# Patient Record
Sex: FEMALE | Race: BLACK OR AFRICAN AMERICAN | NOT HISPANIC OR LATINO | Employment: OTHER | URBAN - METROPOLITAN AREA
[De-identification: names, ages, dates, MRNs, and addresses within clinical notes are randomized per-mention and may not be internally consistent; named-entity substitution may affect disease eponyms.]

---

## 2024-03-22 ENCOUNTER — TELEPHONE (OUTPATIENT)
Dept: OPHTHALMOLOGY | Facility: CLINIC | Age: 74
End: 2024-03-22
Payer: MEDICARE

## 2024-03-22 NOTE — TELEPHONE ENCOUNTER
----- Message from Diamone Speed sent at 3/22/2024  2:37 PM CDT -----  Regarding: self  Type: Patient Call Back       Who called: self        What is the request in detail: pt needs a call back to get an appt schedule        Can the clinic reply by [facility]? Yes        Would the patient rather a call back or a response via [facility]?call back       Best call back number [phone]

## 2024-05-09 ENCOUNTER — DOCUMENTATION ONLY (OUTPATIENT)
Dept: OPHTHALMOLOGY | Facility: CLINIC | Age: 74
End: 2024-05-09

## 2024-05-09 ENCOUNTER — OFFICE VISIT (OUTPATIENT)
Dept: OPHTHALMOLOGY | Facility: CLINIC | Age: 74
End: 2024-05-09
Payer: MEDICARE

## 2024-05-09 DIAGNOSIS — E11.9 DIABETES MELLITUS TYPE 2 WITHOUT RETINOPATHY: ICD-10-CM

## 2024-05-09 DIAGNOSIS — H25.811 COMBINED FORMS OF AGE-RELATED CATARACT OF RIGHT EYE: Primary | ICD-10-CM

## 2024-05-09 DIAGNOSIS — H25.812 COMBINED FORMS OF AGE-RELATED CATARACT OF LEFT EYE: ICD-10-CM

## 2024-05-09 PROCEDURE — 99212 OFFICE O/P EST SF 10 MIN: CPT | Mod: PBBFAC | Performed by: OPHTHALMOLOGY

## 2024-05-09 PROCEDURE — 99999 PR PBB SHADOW E&M-EST. PATIENT-LVL II: CPT | Mod: PBBFAC,,, | Performed by: OPHTHALMOLOGY

## 2024-05-09 PROCEDURE — 99204 OFFICE O/P NEW MOD 45 MIN: CPT | Mod: S$PBB,,, | Performed by: OPHTHALMOLOGY

## 2024-05-09 RX ORDER — ASPIRIN 81 MG/1
1 TABLET ORAL DAILY
COMMUNITY

## 2024-05-09 RX ORDER — AMLODIPINE BESYLATE 5 MG/1
1 TABLET ORAL DAILY
COMMUNITY

## 2024-05-09 RX ORDER — PREDNISOLONE ACETATE 10 MG/ML
1 SUSPENSION/ DROPS OPHTHALMIC 4 TIMES DAILY
Qty: 5 ML | Refills: 0 | Status: SHIPPED | OUTPATIENT
Start: 2024-05-09 | End: 2024-06-17 | Stop reason: SDUPTHER

## 2024-05-09 RX ORDER — METOPROLOL TARTRATE 25 MG/1
1 TABLET, FILM COATED ORAL 2 TIMES DAILY
COMMUNITY

## 2024-05-09 RX ORDER — MELOXICAM 7.5 MG/1
1 TABLET ORAL DAILY
COMMUNITY

## 2024-05-09 RX ORDER — VIT C/E/ZN/COPPR/LUTEIN/ZEAXAN 250MG-90MG
CAPSULE ORAL
COMMUNITY

## 2024-05-09 RX ORDER — NEPAFENAC 3 MG/ML
1 SUSPENSION/ DROPS OPHTHALMIC DAILY
Qty: 3 ML | Refills: 0 | Status: SHIPPED | OUTPATIENT
Start: 2024-05-09 | End: 2024-06-17 | Stop reason: SDUPTHER

## 2024-05-09 NOTE — PROGRESS NOTES
HPI     Cataract     Additional comments: New patient  Last exam 03/2024      Patient states VA has decreased at all ranges in OU over the last 3   months, images appear dull and cloudy along with glare sensitivity.   Patient states she was seen in March by another physician and was told she   had cataracts, also has a current RX for glasses that is not with her   secondary to her feeling that the glasses do not improve her VA.            Comments    1. Cataracts OU  2. Diabetic since 2015 (approx)             Last edited by Lien Mejia, Patient Care Assistant on 5/9/2024 10:19   AM.            Assessment /Plan     For exam results, see Encounter Report.    Visually Significant Cataract OS  Patient reports decreased vision consistent with the clinical amount of lenticular opacity. Cataract is visually significant and affects activities of daily living including reading and glare. Risks, benefits, and alternatives to cataract surgery were discussed. Discussion of risks included increased dryness, possibility of infection, need for more surgery, as well as permanent vision loss.The patient expressed a desire to proceed with surgery with the potential for some reasonable degree of visual improvement. Recommended regular use of artificial tears and good lid hygiene to optimize surgical outcome.     Discussed intraocular lens options. Monofocal - Distance. Patient understands that glasses will be generally needed at all times for near vision and often for finer distance correction especially for higher degrees of astigmatism.     Final visual acuity may be limited by None    None    Other considerations: None    Dilation: good  Alpha Blockers: none    Following medications prescribed:  Prednisolone   Ilevro     Combined forms of age-related cataract of right eye  Will address after OS    Diabeties Type ll without Retinopathy        Diabetes controlled with no diabetic retinopathy on dilated exam. Reviewed diabetic eye  precautions including excellent blood sugar control, and importance of regular follow up.

## 2024-05-09 NOTE — PROGRESS NOTES
Short Stay Record    Diagnosis: Nuclear Sclerotic Cataract left    CC: Blurry Vision     HPI:  Kamilah Manley is a 73 y.o. female who presents for evaluation prior to ophthalmic surgery. No current complaints.     Past Medical History:   Diagnosis Date    Cataract     Diabetes mellitus      No past surgical history on file.  Social History     Tobacco Use    Smoking status: Never    Smokeless tobacco: Never   Substance Use Topics    Alcohol use: Not on file     Family History   Problem Relation Name Age of Onset    Cancer Brother      Hypertension Maternal Grandmother      Diabetes Maternal Grandmother      Blindness Maternal Grandmother       Review of patient's allergies indicates:   Allergen Reactions    Naproxen Other (See Comments)    Rofecoxib Other (See Comments)    Penicillins          Current Outpatient Medications:     amLODIPine (NORVASC) 5 MG tablet, Take 1 tablet by mouth once daily., Disp: , Rfl:     aspirin (ECOTRIN) 81 MG EC tablet, Take 1 tablet by mouth once daily., Disp: , Rfl:     cholecalciferol, vitamin D3, (VITAMIN D3) 25 mcg (1,000 unit) capsule, Take 1 unit twice a day by oral route., Disp: , Rfl:     meloxicam (MOBIC) 7.5 MG tablet, Take 1 tablet by mouth once daily., Disp: , Rfl:     metoprolol tartrate (LOPRESSOR) 25 MG tablet, Take 1 tablet by mouth 2 (two) times daily., Disp: , Rfl:     nepafenac (ILEVRO) 0.3 % DrpS, Apply 1 drop to eye once daily., Disp: 3 mL, Rfl: 0    prednisoLONE acetate (PRED FORTE) 1 % DrpS, Place 1 drop into the left eye 4 (four) times daily., Disp: 5 mL, Rfl: 0    Review of Systems:  10 Pt ROS negative except as stated in HPI    Physical Exam:  General Appearance:    A&Ox3, no distress, appears stated age   Head:    Normocephalic, without obvious abnormality, atraumatic   Eyes:    PERRL, EOM's intact   Back:     Symmetric, no curvature   Lungs:     respirations unlabored   Chest Wall:    No tenderness or deformity    Heart:  Abdomen:  Extremities:  Skin:    S1  and S2 present    Soft, non-tender    Extremities normal, atraumatic    Skin color, texture, turgor normal     Patient is stable for ophthalmic surgery under local and MAC.       _

## 2024-05-24 ENCOUNTER — OFFICE VISIT (OUTPATIENT)
Dept: OPHTHALMOLOGY | Facility: CLINIC | Age: 74
End: 2024-05-24
Payer: MEDICARE

## 2024-05-24 DIAGNOSIS — H25.812 COMBINED FORMS OF AGE-RELATED CATARACT OF LEFT EYE: Primary | ICD-10-CM

## 2024-05-24 PROCEDURE — 99212 OFFICE O/P EST SF 10 MIN: CPT | Mod: PBBFAC,25 | Performed by: OPHTHALMOLOGY

## 2024-05-24 PROCEDURE — 99499 UNLISTED E&M SERVICE: CPT | Mod: S$PBB,,, | Performed by: OPHTHALMOLOGY

## 2024-05-24 PROCEDURE — 92136 OPHTHALMIC BIOMETRY: CPT | Mod: PBBFAC | Performed by: OPHTHALMOLOGY

## 2024-05-24 PROCEDURE — 99999 PR PBB SHADOW E&M-EST. PATIENT-LVL II: CPT | Mod: PBBFAC,,, | Performed by: OPHTHALMOLOGY

## 2024-05-24 NOTE — PROGRESS NOTES
HPI    1. Cataracts OU  2. Diabetic since 2015 (approx)   Pt here for measurements    Last edited by Jordyn Sandoval MD on 5/24/2024  1:47 PM.            Assessment /Plan     For exam results, see Encounter Report.    Combined forms of age-related cataract of left eye  -     IOL Master - OU - Both Eyes  Cataract surgery measurements done today. See 5/9/24 note for pre op information.

## 2024-06-10 ENCOUNTER — OUTSIDE PLACE OF SERVICE (OUTPATIENT)
Dept: OPHTHALMOLOGY | Facility: CLINIC | Age: 74
End: 2024-06-10
Payer: MEDICARE

## 2024-06-11 ENCOUNTER — OFFICE VISIT (OUTPATIENT)
Dept: OPHTHALMOLOGY | Facility: CLINIC | Age: 74
End: 2024-06-11
Payer: MEDICARE

## 2024-06-11 DIAGNOSIS — Z98.890 POST-OPERATIVE STATE: Primary | ICD-10-CM

## 2024-06-11 DIAGNOSIS — Z98.42 STATUS POST CATARACT EXTRACTION AND INSERTION OF INTRAOCULAR LENS OF LEFT EYE: ICD-10-CM

## 2024-06-11 DIAGNOSIS — Z96.1 STATUS POST CATARACT EXTRACTION AND INSERTION OF INTRAOCULAR LENS OF LEFT EYE: ICD-10-CM

## 2024-06-11 PROCEDURE — 99999 PR PBB SHADOW E&M-EST. PATIENT-LVL II: CPT | Mod: PBBFAC,,, | Performed by: OPHTHALMOLOGY

## 2024-06-11 PROCEDURE — 99212 OFFICE O/P EST SF 10 MIN: CPT | Mod: PBBFAC | Performed by: OPHTHALMOLOGY

## 2024-06-11 PROCEDURE — 99024 POSTOP FOLLOW-UP VISIT: CPT | Mod: POP,,, | Performed by: OPHTHALMOLOGY

## 2024-06-11 NOTE — PROGRESS NOTES
HPI     Post-op Evaluation     Additional comments: S/p OS 06/10/2024 Pt states vision doing well   understands she is still healing. Currently using Ilevro once a day and   pred acetate qid           Last edited by Terry Coello on 6/11/2024  9:11 AM.            Assessment /Plan     For exam results, see Encounter Report.    Post-operative state  Status post cataract extraction and insertion of intraocular lens of left eye  POD#1 S/P phaco/IOL OS Doing well. Patient denies significant pain. Mild edema    Continue gtts to operative eye:  PF QID  Ilevro    Reinstructed on importance of absolute compliance with Post-OP instructions including medications, shield at bedtime, protective glasses during the day, and limitation of activities. Follow up appointments in approximately one and four weeks or call immediately for increased pain, redness or vision loss.       RTC 1 week. MOCT if PH worse than 20/25

## 2024-06-11 NOTE — PROGRESS NOTES
HPI     Post-op Evaluation     Additional comments: S/p OS 06/10/2024 Pt states vision doing well   understands she is still healing. Currently using Ilevro once a day and   pred acetate qid           Last edited by Terry Coello on 6/11/2024  9:11 AM.            Assessment /Plan     For exam results, see Encounter Report.    Post-operative state  POD#1 S/P phaco/IOL OS Doing well. Patient denies significant pain. Mild edema    Continue gtts to operative eye:  PF QID  Ilevro    Reinstructed on importance of absolute compliance with Post-OP instructions including medications, shield at bedtime, protective glasses during the day, and limitation of activities. Follow up appointments in approximately one and four weeks or call immediately for increased pain, redness or vision loss.     RTC 1 week. MOCT if PH worse than 20/25

## 2024-06-11 NOTE — PROGRESS NOTES
HPI     Post-op Evaluation     Additional comments: S/p OS 06/10/2024 Pt states vision doing well   understands she is still healing. Currently using Ilevro once a day and   pred acetate qid           Last edited by Terry Coello on 6/11/2024  9:11 AM.            Assessment /Plan     For exam results, see Encounter Report.    Post-operative state      ***

## 2024-06-17 ENCOUNTER — DOCUMENTATION ONLY (OUTPATIENT)
Dept: OPHTHALMOLOGY | Facility: CLINIC | Age: 74
End: 2024-06-17

## 2024-06-17 ENCOUNTER — OFFICE VISIT (OUTPATIENT)
Dept: OPHTHALMOLOGY | Facility: CLINIC | Age: 74
End: 2024-06-17
Payer: MEDICARE

## 2024-06-17 DIAGNOSIS — Z98.42 STATUS POST CATARACT EXTRACTION AND INSERTION OF INTRAOCULAR LENS OF LEFT EYE: ICD-10-CM

## 2024-06-17 DIAGNOSIS — Z96.1 STATUS POST CATARACT EXTRACTION AND INSERTION OF INTRAOCULAR LENS OF LEFT EYE: ICD-10-CM

## 2024-06-17 DIAGNOSIS — H25.811 COMBINED FORMS OF AGE-RELATED CATARACT OF RIGHT EYE: ICD-10-CM

## 2024-06-17 DIAGNOSIS — Z98.890 POST-OPERATIVE STATE: Primary | ICD-10-CM

## 2024-06-17 PROCEDURE — 99212 OFFICE O/P EST SF 10 MIN: CPT | Mod: PBBFAC | Performed by: OPHTHALMOLOGY

## 2024-06-17 PROCEDURE — 99024 POSTOP FOLLOW-UP VISIT: CPT | Mod: POP,,, | Performed by: OPHTHALMOLOGY

## 2024-06-17 PROCEDURE — 99999 PR PBB SHADOW E&M-EST. PATIENT-LVL II: CPT | Mod: PBBFAC,,, | Performed by: OPHTHALMOLOGY

## 2024-06-17 RX ORDER — NEPAFENAC 3 MG/ML
1 SUSPENSION/ DROPS OPHTHALMIC DAILY
Qty: 3 ML | Refills: 0 | Status: SHIPPED | OUTPATIENT
Start: 2024-06-17

## 2024-06-17 RX ORDER — PREDNISOLONE ACETATE 10 MG/ML
1 SUSPENSION/ DROPS OPHTHALMIC 4 TIMES DAILY
Qty: 5 ML | Refills: 0 | Status: SHIPPED | OUTPATIENT
Start: 2024-06-17

## 2024-06-17 NOTE — PROGRESS NOTES
HPI    Pt here for 1 week post op phaco/IOL OS  Current eye drops Ilevro and Pred.   Vision in OS doing well  Noticed swelling up sx eye on Sunday on the cheek area pain scale 0.  Pt denies pain and discomfort, denies flashes of light and floaters.       Last edited by Clemente Kee on 6/17/2024  9:58 AM.            Assessment /Plan     For exam results, see Encounter Report.    Post-operative state  Status post cataract extraction and insertion of intraocular lens of left eye  POW#1 S/P phaco/IOL OS : Doing well with no evidence of infection.     Continue gtts to operative eye:   Trace Cell. PF Taper 4-3-2-1 then stop    Pt given and instructed in one week postop instructions. Can resume normal activitites and d/c eye shield. OTC reading glasses can be used until evaluated for final MR.       Combined forms of age-related cataract of right eye  Patient reports decreased vision in the fellow eye consistent with the clinical amount of lenticular opacity. Cataract is visually significant and affects activities of daily living including reading and glare. Risks, benefits, and alternatives to cataract surgery were discussed. Discussion of risks included increased dryness, possibility of infection, need for more surgery, as well as permanent vision loss.The patient expressed a desire to proceed with surgery with the potential for some reasonable degree of visual improvement. Recommended regular use of artificial tears and good lid hygiene to optimize surgical outcome.     Discussed intraocular lens options. Monofocal - Distance. Patient understands that glasses will be generally needed at all times for near vision and often for finer distance correction especially for higher degrees of astigmatism.     Final visual acuity may be limited by astigmatism      Other considerations: None    Dilation: good  Alpha Blockers: none    Following medications prescribed:  Prednisolone   Ilevro

## 2024-06-17 NOTE — PROGRESS NOTES
Short Stay Record    Diagnosis: Nuclear Sclerotic Cataract right    CC: Blurry Vision     HPI:  Kamilah Manley is a 73 y.o. female who presents for evaluation prior to ophthalmic surgery. No current complaints.     Past Medical History:   Diagnosis Date    Cataract     Diabetes mellitus      No past surgical history on file.  Social History     Tobacco Use    Smoking status: Never    Smokeless tobacco: Never   Substance Use Topics    Alcohol use: Not on file     Family History   Problem Relation Name Age of Onset    Cancer Brother      Hypertension Maternal Grandmother      Diabetes Maternal Grandmother      Blindness Maternal Grandmother       Review of patient's allergies indicates:   Allergen Reactions    Naproxen Other (See Comments)    Rofecoxib Other (See Comments)    Penicillins          Current Outpatient Medications:     amLODIPine (NORVASC) 5 MG tablet, Take 1 tablet by mouth once daily., Disp: , Rfl:     aspirin (ECOTRIN) 81 MG EC tablet, Take 1 tablet by mouth once daily., Disp: , Rfl:     cholecalciferol, vitamin D3, (VITAMIN D3) 25 mcg (1,000 unit) capsule, Take 1 unit twice a day by oral route., Disp: , Rfl:     meloxicam (MOBIC) 7.5 MG tablet, Take 1 tablet by mouth once daily., Disp: , Rfl:     metoprolol tartrate (LOPRESSOR) 25 MG tablet, Take 1 tablet by mouth 2 (two) times daily., Disp: , Rfl:     nepafenac (ILEVRO) 0.3 % DrpS, Apply 1 drop to eye once daily., Disp: 3 mL, Rfl: 0    prednisoLONE acetate (PRED FORTE) 1 % DrpS, Place 1 drop into the left eye 4 (four) times daily., Disp: 5 mL, Rfl: 0    Review of Systems:  10 Pt ROS negative except as stated in HPI    Physical Exam:  General Appearance:    A&Ox3, no distress, appears stated age   Head:    Normocephalic, without obvious abnormality, atraumatic   Eyes:    PERRL, EOM's intact   Back:     Symmetric, no curvature   Lungs:     respirations unlabored   Chest Wall:    No tenderness or deformity    Heart:  Abdomen:  Extremities:  Skin:    S1  and S2 present    Soft, non-tender    Extremities normal, atraumatic    Skin color, texture, turgor normal     Patient is stable for ophthalmic surgery under local and MAC.       _

## 2024-07-01 ENCOUNTER — OUTSIDE PLACE OF SERVICE (OUTPATIENT)
Dept: OPHTHALMOLOGY | Facility: CLINIC | Age: 74
End: 2024-07-01
Payer: MEDICARE

## 2024-07-01 PROCEDURE — 66984 XCAPSL CTRC RMVL W/O ECP: CPT | Mod: 79,RT,, | Performed by: OPHTHALMOLOGY

## 2024-07-02 ENCOUNTER — OFFICE VISIT (OUTPATIENT)
Dept: OPHTHALMOLOGY | Facility: CLINIC | Age: 74
End: 2024-07-02
Payer: MEDICARE

## 2024-07-02 DIAGNOSIS — Z96.1 STATUS POST CATARACT EXTRACTION AND INSERTION OF INTRAOCULAR LENS OF RIGHT EYE: Primary | ICD-10-CM

## 2024-07-02 DIAGNOSIS — Z98.41 STATUS POST CATARACT EXTRACTION AND INSERTION OF INTRAOCULAR LENS OF RIGHT EYE: Primary | ICD-10-CM

## 2024-07-02 DIAGNOSIS — Z98.890 POST-OPERATIVE STATE: ICD-10-CM

## 2024-07-02 PROCEDURE — 99999 PR PBB SHADOW E&M-EST. PATIENT-LVL II: CPT | Mod: PBBFAC,,, | Performed by: OPHTHALMOLOGY

## 2024-07-02 PROCEDURE — 99212 OFFICE O/P EST SF 10 MIN: CPT | Mod: PBBFAC | Performed by: OPHTHALMOLOGY

## 2024-07-02 NOTE — PROGRESS NOTES
HPI     Post-op Evaluation     Additional comments: Pt is here for 1 day PCIOL OD. Denies pain or   irritation. Va stable. 100% with gtts           Comments    PCIOL OS 06/10/2024  PCIOL OD 07/01/2024  2. Diabetic since 2015 (approx)             Last edited by Sarita Villafuerte on 7/2/2024  9:43 AM.            Assessment /Plan     For exam results, see Encounter Report.  Post-operative state  Status post cataract extraction and insertion of intraocular lens of right eye  POD#1 S/P phaco/IOL OD Doing well. Patient denies significant pain. Mild edema    Continue gtts to operative eye:  PF QID  Ilevro    Reinstructed on importance of absolute compliance with Post-OP instructions including medications, shield at bedtime, protective glasses during the day, and limitation of activities. Follow up appointments in approximately one and four weeks or call immediately for increased pain, redness or vision loss.     RTC 1 week. MOCT if PH worse than 20/25

## 2024-07-09 ENCOUNTER — OFFICE VISIT (OUTPATIENT)
Dept: OPHTHALMOLOGY | Facility: CLINIC | Age: 74
End: 2024-07-09
Payer: MEDICARE

## 2024-07-09 DIAGNOSIS — Z98.890 POST-OPERATIVE STATE: Primary | ICD-10-CM

## 2024-07-09 DIAGNOSIS — Z98.42 STATUS POST CATARACT EXTRACTION AND INSERTION OF INTRAOCULAR LENS OF LEFT EYE: ICD-10-CM

## 2024-07-09 DIAGNOSIS — Z96.1 STATUS POST CATARACT EXTRACTION AND INSERTION OF INTRAOCULAR LENS OF LEFT EYE: ICD-10-CM

## 2024-07-09 DIAGNOSIS — Z98.41 STATUS POST CATARACT EXTRACTION AND INSERTION OF INTRAOCULAR LENS OF RIGHT EYE: ICD-10-CM

## 2024-07-09 DIAGNOSIS — Z96.1 STATUS POST CATARACT EXTRACTION AND INSERTION OF INTRAOCULAR LENS OF RIGHT EYE: ICD-10-CM

## 2024-07-09 PROCEDURE — 99999 PR PBB SHADOW E&M-EST. PATIENT-LVL II: CPT | Mod: PBBFAC,,, | Performed by: OPHTHALMOLOGY

## 2024-07-09 PROCEDURE — 99024 POSTOP FOLLOW-UP VISIT: CPT | Mod: POP,,, | Performed by: OPHTHALMOLOGY

## 2024-07-09 PROCEDURE — 99212 OFFICE O/P EST SF 10 MIN: CPT | Mod: PBBFAC | Performed by: OPHTHALMOLOGY

## 2024-07-09 NOTE — PROGRESS NOTES
HPI     Post-op Evaluation     Additional comments: Sp PCIOL OD 07/01/2024    Sp PCIOL OS 06/10/2024           Comments    No complaints per pt.    Pred Forte qid OD  Ilevro 1qd OD          Last edited by Gwen Velázquez MA on 7/9/2024 10:00 AM.            Assessment /Plan     For exam results, see Encounter Report.    Post-operative state  Status post cataract extraction and insertion of intraocular lens of right eye  POW#1 S/P phaco/IOL OD : Doing well with no evidence of infection.     Continue gtts to operative eye:   Trace Cell. PF Taper 4-3-2-1 then stop    Pt given and instructed in one week postop instructions. Can resume normal activitites and d/c eye shield. OTC reading glasses can be used until evaluated for final MR.        Status post cataract extraction and insertion of intraocular lens of left eye  Stable, monitor yearly.     RTC 3 weeks for glasses RX

## 2024-07-29 ENCOUNTER — OFFICE VISIT (OUTPATIENT)
Dept: OPHTHALMOLOGY | Facility: CLINIC | Age: 74
End: 2024-07-29
Payer: MEDICARE

## 2024-07-29 DIAGNOSIS — Z98.890 POST-OPERATIVE STATE: Primary | ICD-10-CM

## 2024-07-29 PROCEDURE — 99212 OFFICE O/P EST SF 10 MIN: CPT | Mod: PBBFAC | Performed by: OPHTHALMOLOGY

## 2024-07-29 PROCEDURE — 99999 PR PBB SHADOW E&M-EST. PATIENT-LVL II: CPT | Mod: PBBFAC,,, | Performed by: OPHTHALMOLOGY

## 2024-07-29 NOTE — PROGRESS NOTES
HPI     Post-op Evaluation     Additional comments: Pt here for 3 wk PCIOL OD PO. No pain or discomfort.   VA stable. 100% compliant with gtts.            Comments    PCIOL OS 06/10/2024  PCIOL OD 07/01/2024  2. Diabetic since 2015 (approx)             Last edited by Lopez Mandel MA on 7/29/2024  9:57 AM.            Assessment /Plan     For exam results, see Encounter Report.    Post-operative state  S/P phaco/IOL OU : Doing Well and completing healing process. Final visual correction options discussed and appropriate prescriptions and/or OTC reading glass recommendations offered to patient. Mrx offered for readers. Pt instructed to follow up in 6 months for next eye exam or PRN any pain, redness, vision changes or other concerns.

## 2025-02-18 ENCOUNTER — OFFICE VISIT (OUTPATIENT)
Dept: OPHTHALMOLOGY | Facility: CLINIC | Age: 75
End: 2025-02-18
Payer: MEDICARE

## 2025-02-18 DIAGNOSIS — Z96.1 PSEUDOPHAKIA OF BOTH EYES: ICD-10-CM

## 2025-02-18 DIAGNOSIS — H04.123 DRY EYES: ICD-10-CM

## 2025-02-18 DIAGNOSIS — E11.9 DIABETES MELLITUS TYPE 2 WITHOUT RETINOPATHY: Primary | ICD-10-CM

## 2025-02-18 PROCEDURE — 99212 OFFICE O/P EST SF 10 MIN: CPT | Mod: PBBFAC,PO | Performed by: OPHTHALMOLOGY

## 2025-02-18 NOTE — PROGRESS NOTES
HPI     Annual Exam     Additional comments: Pt here for 6m pseudo exam. No pain or discomfort.   VA stable.            Comments    1. PCIOL OS 06/10/2024  PCIOL OD 07/01/2024  2. Diabetic since 2015 (approx)           Last edited by Lopez Mandel MA on 2/18/2025  8:21 AM.            Assessment /Plan     For exam results, see Encounter Report.    Diabetes mellitus type 2 without retinopathy  Diabetes controlled with no diabetic retinopathy on dilated exam. Reviewed diabetic eye precautions including excellent blood sugar control, and importance of regular follow up.     Dry Eyes  Patient with symptoms and exam consistent with dry eye. Start artificial tears 3-4 times daily with warm compresses.     Pseudophakia of both eyes  Condition stable, no therapeutic intervention necessary at this time. Will continue to monitor.      Return to clinic in 1 year or sooner PRN